# Patient Record
(demographics unavailable — no encounter records)

---
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
## 2024-12-19 NOTE — REVIEW OF SYSTEMS
[SOB on Exertion] : shortness of breath during exertion [Dizziness] : dizziness [Negative] : Heme/Lymph

## 2024-12-20 NOTE — PHYSICAL EXAM
[General Appearance - Alert] : alert [General Appearance - In No Acute Distress] : in no acute distress [Sclera] : the sclera and conjunctiva were normal [Outer Ear] : the ears and nose were normal in appearance [Neck Appearance] : the appearance of the neck was normal [Jugular Venous Distention Increased] : there was no jugular-venous distention [] : no respiratory distress [Respiration, Rhythm And Depth] : normal respiratory rhythm and effort [Auscultation Breath Sounds / Voice Sounds] : lungs were clear to auscultation bilaterally [Normal Rate] : normal [Rhythm Regular] : regular [Normal S1] : normal S1 [Normal S2] : normal S2 [III] : a grade 3 [No Pitting Edema] : no pitting edema present [Right Carotid Bruit] : right carotid bruit heard [Left Carotid Bruit] : left carotid bruit heard [2+] : left 2+ [Examination Of The Chest] : the chest was normal in appearance [No Abnormalities] : the abdominal aorta was not enlarged and no bruit was heard [Bowel Sounds] : normal bowel sounds [Abdomen Soft] : soft [No CVA Tenderness] : no ~M costovertebral angle tenderness [Abnormal Walk] : normal gait [Skin Color & Pigmentation] : normal skin color and pigmentation [No Focal Deficits] : no focal deficits [Oriented To Time, Place, And Person] : oriented to person, place, and time [FreeTextEntry1] : deferred
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
## 2024-12-20 NOTE — ASSESSMENT
[FreeTextEntry1] : I have discussed the indications for surgery which include: decrease ejection fraction and worsening aortic stenosis on echocardiogram, signs and symptoms of congestive heart failure, and other necessary cardiac procedures such as coronary artery bypass grafting. Given his symptoms of shortness of breath, recent admission for syncope and his severe aortic stenosis/aortic regurgitation, he meets those indications. I had a lengthy discussion with the patient regarding his valvular disease and progression. I have recommended that the patient is a candidate for aortic valve replacement, coronary artery bypass grafting.    I have discussed the risks, benefits and alternatives to surgery. I have explained the risks of the surgery, including approximately 1% major mortality or morbidity including stroke, infection, bleeding, death, renal failure and heart attack. All questions were addressed and patient agrees to proceed with surgery.     --dental clearance (last dental visit within 6 months) --admit on 1/2 for HF management. OR on 1/3 for AVR, CABG. --Discussed signs and symptoms that warrant emergency medical attention. --continue cardiology care with Dr. Bee.  --continue current medication regimen.

## 2024-12-20 NOTE — END OF VISIT
[Time Spent: ___ minutes] : I have spent [unfilled] minutes of time on the encounter which excludes teaching and separately reported services. [FreeTextEntry3] :   I, Dr. Alexandro Jimenez, personally performed the evaluation and management (E/M) services for this new patient.  That E/M includes conducting the clinically appropriate initial history &/or exam, assessing all conditions, and establishing the plan of care.  Today, my SMITHA,  was here to observe &/or participate in the visit & follow plan of care established by me.   Severe LMS and severe AS - advised to minimise any exertion/stress, and to not work. Urgent surgery scheduled. Understands need to present to ED if any symptoms in the mean time.

## 2024-12-20 NOTE — DATA REVIEWED
[FreeTextEntry1] : L+RHC 12/13/24 LM 70%  LAD 80% proximal 100% mid LAD  LCx 80% proximal LCx  80% proximal OM1 RCA 70% distal   labs 12/12/24 K 4.3 BUN 15 Creatinine 0.98
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
## 2025-01-09 NOTE — REASON FOR VISIT
[Follow-Up: _____] : a [unfilled] follow-up visit [Home] : at home, [unfilled] , at the time of the visit. [Other Location: e.g. Home (Enter Location, City,State)___] : at [unfilled] [Patient] : the patient [Self] : self [Spouse] : spouse [FreeTextEntry2] : 1/3/25

## 2025-01-09 NOTE — PLAN
[TextEntry] : Post Operative Care:  Pt advised of importance of daily weights.  Pt instructed on how to use incentive spirometer every hour, demonstrated proper use.  Pt encouraged to ambulate as much as tolerated, avoiding extreme temperatures outdoors.  Also advised to cleanse incisions daily with mild soap and water and to avoid lotions, powders, ointments or creams near or on the incision. Low salt, low fat diet encouraged and discussed.  Pt advised to avoid heavy lifting or straining. Pt advised no driving until cleared by Dr. Jimenez.    [facility] team will continue to follow up with pt status.  NP/CCC roles explained with pt understanding, contact information provided. Pt agrees to call with any questions, issues or concerns.  Worsening symptoms reviewed with patient understanding.      FOLLOW UP APPOINTMENTS:  CTS: Dr. Jimenez appointment 1/14/25  CARDIOLOGIST: Dr. Bee 1 week  PCP: Pt encouraged to follow up within one month of discharge

## 2025-01-09 NOTE — PHYSICAL EXAM
[Sclera] : the sclera and conjunctiva were normal [PERRL With Normal Accommodation] : pupils were equal in size, round, and reactive to light [Neck Appearance] : the appearance of the neck was normal [Respiration, Rhythm And Depth] : normal respiratory rhythm and effort [Exaggerated Use Of Accessory Muscles For Inspiration] : no accessory muscle use [Examination Of The Chest] : the chest was normal in appearance [Chest Visual Inspection Thoracic Asymmetry] : no chest asymmetry [Diminished Respiratory Excursion] : normal chest expansion [Abnormal Walk] : normal gait [Nail Clubbing] : no clubbing  or cyanosis of the fingernails [Involuntary Movements] : no involuntary movements were seen [Skin Color & Pigmentation] : normal skin color and pigmentation [Skin Turgor] : normal skin turgor [] : no rash [FreeTextEntry1] : CT sites clean, dry, and intact. SVG site clean, dry,and intact. Trace LE edema. [No Focal Deficits] : no focal deficits [Oriented To Time, Place, And Person] : oriented to person, place, and time [Impaired Insight] : insight and judgment were intact [Affect] : the affect was normal [Mood] : the mood was normal [Memory Recent] : recent memory was not impaired [Memory Remote] : remote memory was not impaired

## 2025-01-09 NOTE — ASSESSMENT
[FreeTextEntry1] : S/p AVR- continue Asa, Furosemide, and Metoprolol Tartrate. S/p CABG x2- continue Asa, Atorvastatin, and Metoprolol Tartrate.
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
## 2025-01-15 NOTE — REASON FOR VISIT
[Family Member] : family member [de-identified] : s/p AVR 27 mm Inspiris, CABG X2  [de-identified] : 1/3/25 [de-identified] : Intra-operative course was uncomplicated. He arrived to the CTICU intubated on primacor and levo. Oozy from L pleural CT, 1u pRBC given. On POD1 extubated, transfused 2 additional units of pRBC. Weaned off primacor and levo. [place] removed. On POD2 Ambulated in halls. BB started. On POD3 Mediastinal CT removed. Transferred to stepdown unit. BB increased. POD4 PW removed. Mediastinal and pleural drains removed. POD5 TTE without definitive pericardial effusion. Patient feels well, has no complaints. Tolerating PO diet, satting well on room air, ambulating independently. Per Dr. Jimenez he is medically stable for discharge home 1/8/24.  Chest xray today: Postop change. Very minimal left pleural effusion. No pneumothorax. No focal or acute infiltrate.  Patient is recuperating well from surgery. He is ambulating and increasing his activities daily. Patient denies fever, chills, dizziness, syncope, shortness of breath, chest pain, palpitations or peripheral edema.

## 2025-01-15 NOTE — END OF VISIT
[FreeTextEntry3] : I, Dr. Alexandro Jimenez, personally performed the evaluation and management (E/M) services for this established patient who presents today with (a) new problem(s)/exacerbation of (an) existing condition(s).  That E/M includes conducting the clinically appropriate interval history &/or exam, assessing all new/exacerbated conditions, and establishing a new plan of care.  Today, my SMITHA,  was here to observe &/or participate in the visit & follow plan of care established by me.

## 2025-01-15 NOTE — REASON FOR VISIT
[Family Member] : family member [de-identified] : s/p AVR 27 mm Inspiris, CABG X2  [de-identified] : 1/3/25 [de-identified] : Intra-operative course was uncomplicated. He arrived to the CTICU intubated on primacor and levo. Oozy from L pleural CT, 1u pRBC given. On POD1 extubated, transfused 2 additional units of pRBC. Weaned off primacor and levo. [place] removed. On POD2 Ambulated in halls. BB started. On POD3 Mediastinal CT removed. Transferred to stepdown unit. BB increased. POD4 PW removed. Mediastinal and pleural drains removed. POD5 TTE without definitive pericardial effusion. Patient feels well, has no complaints. Tolerating PO diet, satting well on room air, ambulating independently. Per Dr. Jimenez he is medically stable for discharge home 1/8/24.  Chest xray today: Postop change. Very minimal left pleural effusion. No pneumothorax. No focal or acute infiltrate.  Patient is recuperating well from surgery. He is ambulating and increasing his activities daily. Patient denies fever, chills, dizziness, syncope, shortness of breath, chest pain, palpitations or peripheral edema.

## 2025-01-15 NOTE — REASON FOR VISIT
[Family Member] : family member [de-identified] : s/p AVR 27 mm Inspiris, CABG X2  [de-identified] : 1/3/25 [de-identified] : Intra-operative course was uncomplicated. He arrived to the CTICU intubated on primacor and levo. Oozy from L pleural CT, 1u pRBC given. On POD1 extubated, transfused 2 additional units of pRBC. Weaned off primacor and levo. [place] removed. On POD2 Ambulated in halls. BB started. On POD3 Mediastinal CT removed. Transferred to stepdown unit. BB increased. POD4 PW removed. Mediastinal and pleural drains removed. POD5 TTE without definitive pericardial effusion. Patient feels well, has no complaints. Tolerating PO diet, satting well on room air, ambulating independently. Per Dr. Jimenez he is medically stable for discharge home 1/8/24.  Chest xray today: Postop change. Very minimal left pleural effusion. No pneumothorax. No focal or acute infiltrate.  Patient is recuperating well from surgery. He is ambulating and increasing his activities daily. Patient denies fever, chills, dizziness, syncope, shortness of breath, chest pain, palpitations or peripheral edema.

## 2025-01-15 NOTE — ASSESSMENT
[FreeTextEntry1] : 57 yo M who is s/p AVR 27 mm Inspiris, CABG X2 1/3/25 and presents for post op visit.   - Follow up with PCP/Cardiologist Dr. Bee.  - Continue current medication regimen. - Continue to increase activity and walk daily as tolerated. Continue to use incentive spirometer. - No driving or strenuous activity for six weeks after surgery. Avoid lifting >10 to 15lbs for first two months after surgery. - Continue to use compression stockings. Keep legs elevated above heart when resting/sitting/sleeping. - Call MD if you experience fever, fatigue, dizziness, confusion, syncope, shortness of breath, chest pain not relieved with analgesics, increased redness/drainage from incision. - Patient discharged from CTS service.